# Patient Record
Sex: FEMALE | Race: WHITE | Employment: FULL TIME | URBAN - METROPOLITAN AREA
[De-identification: names, ages, dates, MRNs, and addresses within clinical notes are randomized per-mention and may not be internally consistent; named-entity substitution may affect disease eponyms.]

---

## 2019-04-01 ENCOUNTER — OFFICE VISIT (OUTPATIENT)
Dept: ENDOCRINOLOGY | Age: 44
End: 2019-04-01
Payer: COMMERCIAL

## 2019-04-01 VITALS
WEIGHT: 150.8 LBS | DIASTOLIC BLOOD PRESSURE: 78 MMHG | RESPIRATION RATE: 16 BRPM | SYSTOLIC BLOOD PRESSURE: 116 MMHG | BODY MASS INDEX: 27.75 KG/M2 | HEIGHT: 62 IN | HEART RATE: 86 BPM | OXYGEN SATURATION: 98 %

## 2019-04-01 DIAGNOSIS — E27.40 ADRENAL INSUFFICIENCY (HCC): Primary | ICD-10-CM

## 2019-04-01 DIAGNOSIS — N64.52 BREAST DISCHARGE: ICD-10-CM

## 2019-04-01 PROCEDURE — 99205 OFFICE O/P NEW HI 60 MIN: CPT | Performed by: INTERNAL MEDICINE

## 2019-04-01 RX ORDER — HYDROCHLOROTHIAZIDE 12.5 MG/1
12.5 TABLET ORAL DAILY
COMMUNITY
End: 2019-11-18

## 2019-04-01 RX ORDER — COSYNTROPIN 0.25 MG/ML
0.25 INJECTION, POWDER, FOR SOLUTION INTRAMUSCULAR; INTRAVENOUS ONCE
Qty: 1 EACH | Refills: 0 | Status: SHIPPED | OUTPATIENT
Start: 2019-04-01 | End: 2019-04-01

## 2019-04-01 NOTE — LETTER
[address] [place] [address] Department of Endocrinology Diabetes and Metabolism       Provider: Francisca López MD  [address] [facility], [address] [facility],[address] [address]   Phone: [phone]  Fax: [phone]    Primary Care Physician: Joshua Parsons MD   Referring Provider: Collin Quinn.,*    Patient: Rupert De La Cruz  YOB: 1975  Date of Visit: 4/1/2019      Dear Dr. Joshua Parsons MD   I had the pleasure of seeing your patient Rupert De La Cruz today at endocrine clinic for consultation visit and I enclosed a copy of the office visit completed today. Thank you very much for asking us to participate in the care of this very pleasant patient. Please don't hesitate to call if there are any further questions or concerns. Sincerely   Francisca López MD  Endocrinologist, [facility]   [address] [facility], [address] [place] [address],[address] [address]   Phone: [phone]  Fax: [phone]      ENDOCRINOLOGY CLINIC NOTE   Date of Service: 4/1/2019     Care Team:   Primary Care Physician: Joshua Parsons MD  Referring physician: Collin Quinn.,*  Provider: Francisca López MD     Reason for the visit:   H/o chronic steroid use, ? Secondary adrenal insufficiency     History of Present Illness: The history is provided by the patient. No  was used. Accuracy of the patient data is excellent. Rupert De La Cruz is a very pleasant 37y.o. year-old female with past medical history of asthma seen in endocrine clinic today for evaluation of possible secondary adrenal insufficiency     Over the past 3 years pt received frequent courses of high dose steroid for asthma and joint pain. Last steroid course was 8 months ago.    Shortly after finishing steroid course she start c/o worsening tiredness and fatigue   Pt was seen by her PCP and in 1/2019 she was found to have a random cortisol level of 2.3 (this was done in the afternoon) Repeated AM cortisol level was very good 18,2 as shown below   2019 - random Cortisol 2.3   2019 - AM cortisol was 18.2     Pt denied any hypotension and infact she is on HCTZ 12.5 mg daily   Patient also reported any hard losing Wt below 150Ibs     The patient denied h/o intermittent headache, sweating, or palpitation.  Denied history of diabetes, Wt change, easy bruising, muscle weakness, osteoporosis/fracture, acne, striae, or hyperpigmentation     Thyroid US 2012, I have reviewed images myself  Right lobe measures 4 x 1.2 x 1.5 cm and the left lobe 3.5 x 1.2 x 1.2 cm  Both lobes appear homogenous with no discrete nodules    PAST MEDICAL HISTORY   Past Medical History:   Diagnosis Date    Asthma     Nipple discharge       PAST SURGICAL HISTORY   Past Surgical History:   Procedure Laterality Date    BREAST SURGERY Left      SECTION, LOW TRANSVERSE        SOCIAL HISTORY   Social History     Socioeconomic History    Marital status:      Spouse name: Not on file    Number of children: Not on file    Years of education: Not on file    Highest education level: Not on file   Occupational History    Not on file   Social Needs    Financial resource strain: Not on file    Food insecurity:     Worry: Not on file     Inability: Not on file    Transportation needs:     Medical: Not on file     Non-medical: Not on file   Tobacco Use    Smoking status: Former Smoker     Last attempt to quit: 1996     Years since quittin.4    Smokeless tobacco: Never Used   Substance and Sexual Activity    Alcohol use: No    Drug use: No    Sexual activity: Not on file   Lifestyle    Physical activity:     Days per week: Not on file     Minutes per session: Not on file    Stress: Not on file   Relationships    Social connections:     Talks on phone: Not on file     Gets together: Not on file     Attends Faith service: Not on file Active member of club or organization: Not on file     Attends meetings of clubs or organizations: Not on file     Relationship status: Not on file    Intimate partner violence:     Fear of current or ex partner: Not on file     Emotionally abused: Not on file     Physically abused: Not on file     Forced sexual activity: Not on file   Other Topics Concern    Not on file   Social History Narrative    Not on file      FAMILY HISTORY   Family History   Problem Relation Age of Onset    Heart Disease Mother     Diabetes Mother     Heart Disease Father     Diabetes Father       ALLERGIES AND DRUG REACTIONS   No Known Allergies   CURRENT MEDICATIONS   Current Outpatient Medications   Medication Sig Dispense Refill    hydrochlorothiazide (HYDRODIURIL) 12.5 MG tablet Take 12.5 mg by mouth daily      cosyntropin (CORTROSYN) 0.25 MG injection Infuse 250 mcg intravenously once for 1 dose 1 each 0    montelukast (SINGULAIR) 10 MG tablet Take 10 mg by mouth nightly. No current facility-administered medications for this visit. Review of Systems   Constitutional: No fever, no chills, no diaphoresis, no generalized weakness. HEENT: No blurred vision, No sore throat, no ear pain, no hair loss   Neck: denied any neck swelling, difficulty swallowing,   Cadrdiopulomary: No CP, SOB or palpitation, No orthopnea or PND. No cough or wheezing. GI: No N/V/D, no constipation, No abdominal pain, no melena or hematochezia   : Denied any dysuria, hematuria, flank pain, discharge, or incontinence. Skin: denied any rash, striae ulcer, Hirsute, or hyperpigmentation. MSK: denied any joint deformity, joint pain/swelling, muscle pain, or back pain.    Neuro: no numbess, no tingling, no weakness,     OBJECTIVE   /78 (Site: Left Upper Arm, Position: Sitting, Cuff Size: Medium Adult)   Pulse 86   Resp 16   Ht 5' 2\" (1.575 m)   Wt 150 lb 12.8 oz (68.4 kg)   LMP 03/27/2019   SpO2 98%   BMI 27.58 kg/m² BP Readings from Last 4 Encounters:   04/01/19 116/78      Wt Readings from Last 6 Encounters:   04/01/19 150 lb 12.8 oz (68.4 kg)   11/14/17 151 lb (68.5 kg)        Physical examination:   General: awake alert, oriented x3, no abnormal position or movements. HEENT: normocephalic non traumatic, no exophthalmos   Neck: supple, no LN enlargement, no thyromegaly, no thyroid tenderness, no JVD. Pulm: Clear equal air entry no added sounds, no wheezing or rhonchi   CVS: S1 + S2, no murmur, no heave. Dorsalis pedis pulse palpable   Abd: soft lax, no tenderness, no organomegaly, audible bowel sounds. Skin: warm, no lesions, no rash.  No striae, no bruising, no hirsutism, no tags, no acne   Musculoskeletal: No back tenderness, no kyphosis/scoliosis   Neuro: CN intact, sensation normal, muscle power normal. No tremors   Psych: normal mood, and affect     Review of Laboratory Data:   I personally reviewed the following labs:    1/11/2019  TSH 0.53, Free T4 1.06  1/17/2019  Na 141, K 4.2, Ca 10, Albumin 4.8, Cr 0.8, GFR >60, , , HDL 66.5, , WBC 9.1, Hb 14.1, Plt 288     Lab Results   Component Value Date/Time    WBC 15.3 (H) 11/09/2011 05:30 AM    RBC 2.97 (L) 11/09/2011 05:30 AM    HGB 9.9 (L) 11/09/2011 05:30 AM    HCT 28.2 (L) 11/09/2011 05:30 AM    MCV 94.8 11/09/2011 05:30 AM    MCH 33.3 11/09/2011 05:30 AM    MCHC 35.1 (H) 11/09/2011 05:30 AM    RDW 14.3 11/09/2011 05:30 AM     11/09/2011 05:30 AM    MPV 9.1 11/09/2011 05:30 AM      Lab Results   Component Value Date/Time     11/11/2011 06:25 AM    K 4.0 11/11/2011 06:25 AM    CO2 32 (H) 11/11/2011 06:25 AM    BUN 11 11/11/2011 06:25 AM    CREATININE 0.5 11/11/2011 06:25 AM    CREATININE 0.6 09/18/2011 10:11 AM    CALCIUM 9.0 11/11/2011 06:25 AM    LABGLOM >60 11/11/2011 07:39 AM      Lab Results   Component Value Date/Time    TSH 1.186 09/17/2011 10:05 AM    T4FREE 1.16 09/17/2011 10:05 AM     Lab Results   Component Value Date LABA1C 4.8 09/17/2011    GLUCOSE SEE BELOW 03/30/2012     No results found for: CHOL, TRIG, HDL  No results found for: ALDODIRENCAL   No results found for: ALPRRATIO   No results found for: CHOL, HDL, TRIG   No results found for: PTH   No results found for: VITD25     Thyroid US 12/13/2012, I have reviewed images myself  Right lobe measures 4 x 1.2 x 1.5 cm and the left lobe 3.5 x 1.2 x 1.2 cm  Both lobes appear homogenous with no discrete nodules    Medical Records/Labs/Images review:   I personally reviewed and summarized previous records   All labs and imaging studies were independently reviewed     Robert Gutierrez, a 37 y.o.-old female seen in for the following issues     Low serum cortisol   · Pt at risk for secondary adrenal insufficiency from prolonged steroid use. She was on frequent courses of high steroid for almost 2.5 years for asthma. Last dose was 8 months ago   · Will recheck AM cortisol and also consider ACTH stimulation test if level was low or borderline    · Will follow the results and proceed accordingly     vitD deficiency   · Encourage taking vitD 2000 iu/day  · Check vitD level     Intermittent Left side breast discharge   · Less likely to be hormonally mediated (h/o multiple surgeries on left breast)   · Will check Prolactin level     Return in about 6 months (around 10/1/2019). The above issues were reviewed with the patient who understood and agreed with the plan. Thank you for allowing us to participate in the care of this patient. Please do not hesitate to contact us with any additional questions. Diagnosis Orders   1. Adrenal insufficiency (HCC)  Prolactin    Cortisol Total    cosyntropin (CORTROSYN) 0.25 MG injection    Cortisol Total    Cortisol Total    Cortisol Total    ACTH   2.  Breast discharge  Prolactin     Marisela Ortiz MD   Endocrinologist, [facility])   [address] [facility] [address]   Phone: [phone] Fax: [phone]

## 2019-04-01 NOTE — PROGRESS NOTES
ENDOCRINOLOGY CLINIC NOTE   Date of Service: 2019     Care Team:   Primary Care Physician: Rik Williamson MD  Referring physician: Jordan Mckenna,*  Provider: Justice Muñiz MD     Reason for the visit:   H/o chronic steroid use, ? Secondary adrenal insufficiency     History of Present Illness: The history is provided by the patient. No  was used. Accuracy of the patient data is excellent. Keith Willis is a very pleasant 37y.o. year-old female with past medical history of asthma seen in endocrine clinic today for evaluation of possible secondary adrenal insufficiency     Over the past 3 years pt received frequent courses of high dose steroid for asthma and joint pain. Last steroid course was 8 months ago. Shortly after finishing steroid course she start c/o worsening tiredness and fatigue   Pt was seen by her PCP and in 2019 she was found to have a random cortisol level of 2.3 (this was done in the afternoon)   Repeated AM cortisol level was very good 18,2 as shown below   2019 - random Cortisol 2.3   2019 - AM cortisol was 18.2     Pt denied any hypotension and infact she is on HCTZ 12.5 mg daily   Patient also reported any hard losing Wt below 150Ibs     The patient denied h/o intermittent headache, sweating, or palpitation.  Denied history of diabetes, Wt change, easy bruising, muscle weakness, osteoporosis/fracture, acne, striae, or hyperpigmentation     Thyroid US 2012, I have reviewed images myself  Right lobe measures 4 x 1.2 x 1.5 cm and the left lobe 3.5 x 1.2 x 1.2 cm  Both lobes appear homogenous with no discrete nodules    PAST MEDICAL HISTORY   Past Medical History:   Diagnosis Date    Asthma     Nipple discharge       PAST SURGICAL HISTORY   Past Surgical History:   Procedure Laterality Date    BREAST SURGERY Left      SECTION, LOW TRANSVERSE        SOCIAL HISTORY   Social History     Socioeconomic History    facility-administered medications for this visit. Review of Systems   Constitutional: No fever, no chills, no diaphoresis, no generalized weakness. HEENT: No blurred vision, No sore throat, no ear pain, no hair loss   Neck: denied any neck swelling, difficulty swallowing,   Cadrdiopulomary: No CP, SOB or palpitation, No orthopnea or PND. No cough or wheezing. GI: No N/V/D, no constipation, No abdominal pain, no melena or hematochezia   : Denied any dysuria, hematuria, flank pain, discharge, or incontinence. Skin: denied any rash, striae ulcer, Hirsute, or hyperpigmentation. MSK: denied any joint deformity, joint pain/swelling, muscle pain, or back pain. Neuro: no numbess, no tingling, no weakness,     OBJECTIVE   /78 (Site: Left Upper Arm, Position: Sitting, Cuff Size: Medium Adult)   Pulse 86   Resp 16   Ht 5' 2\" (1.575 m)   Wt 150 lb 12.8 oz (68.4 kg)   LMP 03/27/2019   SpO2 98%   BMI 27.58 kg/m²    BP Readings from Last 4 Encounters:   04/01/19 116/78      Wt Readings from Last 6 Encounters:   04/01/19 150 lb 12.8 oz (68.4 kg)   11/14/17 151 lb (68.5 kg)        Physical examination:   General: awake alert, oriented x3, no abnormal position or movements. HEENT: normocephalic non traumatic, no exophthalmos   Neck: supple, no LN enlargement, no thyromegaly, no thyroid tenderness, no JVD. Pulm: Clear equal air entry no added sounds, no wheezing or rhonchi   CVS: S1 + S2, no murmur, no heave. Dorsalis pedis pulse palpable   Abd: soft lax, no tenderness, no organomegaly, audible bowel sounds. Skin: warm, no lesions, no rash.  No striae, no bruising, no hirsutism, no tags, no acne   Musculoskeletal: No back tenderness, no kyphosis/scoliosis   Neuro: CN intact, sensation normal, muscle power normal. No tremors   Psych: normal mood, and affect     Review of Laboratory Data:   I personally reviewed the following labs:    1/11/2019  TSH 0.53, Free T4 1.06  1/17/2019  Na 141, K 4.2, Ca 10, Albumin 4.8, Cr 0.8, GFR >60, , , HDL 66.5, , WBC 9.1, Hb 14.1, Plt 288     Lab Results   Component Value Date/Time    WBC 15.3 (H) 11/09/2011 05:30 AM    RBC 2.97 (L) 11/09/2011 05:30 AM    HGB 9.9 (L) 11/09/2011 05:30 AM    HCT 28.2 (L) 11/09/2011 05:30 AM    MCV 94.8 11/09/2011 05:30 AM    MCH 33.3 11/09/2011 05:30 AM    MCHC 35.1 (H) 11/09/2011 05:30 AM    RDW 14.3 11/09/2011 05:30 AM     11/09/2011 05:30 AM    MPV 9.1 11/09/2011 05:30 AM      Lab Results   Component Value Date/Time     11/11/2011 06:25 AM    K 4.0 11/11/2011 06:25 AM    CO2 32 (H) 11/11/2011 06:25 AM    BUN 11 11/11/2011 06:25 AM    CREATININE 0.5 11/11/2011 06:25 AM    CREATININE 0.6 09/18/2011 10:11 AM    CALCIUM 9.0 11/11/2011 06:25 AM    LABGLOM >60 11/11/2011 07:39 AM      Lab Results   Component Value Date/Time    TSH 1.186 09/17/2011 10:05 AM    T4FREE 1.16 09/17/2011 10:05 AM     Lab Results   Component Value Date    LABA1C 4.8 09/17/2011    GLUCOSE SEE BELOW 03/30/2012     No results found for: CHOL, TRIG, HDL  No results found for: ALDODIRENCAL   No results found for: ALPRRATIO   No results found for: CHOL, HDL, TRIG   No results found for: PTH   No results found for: VITD25     Thyroid US 12/13/2012, I have reviewed images myself  Right lobe measures 4 x 1.2 x 1.5 cm and the left lobe 3.5 x 1.2 x 1.2 cm  Both lobes appear homogenous with no discrete nodules    Medical Records/Labs/Images review:   I personally reviewed and summarized previous records   All labs and imaging studies were independently reviewed     [address] Berneice Mari, a 37 y.o.-old female seen in for the following issues     Low serum cortisol   · Pt at risk for secondary adrenal insufficiency from prolonged steroid use. She was on frequent courses of high steroid for almost 2.5 years for asthma.  Last dose was 8 months ago   · Will recheck AM cortisol and also consider ACTH stimulation test if level

## 2019-04-01 NOTE — PATIENT INSTRUCTIONS
ACTH Stimulation test instructions:   1. Please measure cortisol and ACTH levels and time 0  2.  Immediately after, give 250 mcg coysntropin via injection  3. 30 and 60 minutes after cosyntropin given, measure cortisol levels    Timing of the above is critical to ensure appropriate interpretation of this test.

## 2019-04-04 DIAGNOSIS — E27.40 HYPOADRENALISM (HCC): ICD-10-CM

## 2019-04-04 RX ORDER — COSYNTROPIN 0.25 MG/ML
0.25 INJECTION, POWDER, FOR SOLUTION INTRAMUSCULAR; INTRAVENOUS ONCE
Status: CANCELLED
Start: 2019-04-08

## 2019-04-08 ENCOUNTER — HOSPITAL ENCOUNTER (OUTPATIENT)
Dept: INFUSION THERAPY | Age: 44
Setting detail: INFUSION SERIES
Discharge: HOME | End: 2019-04-08
Payer: COMMERCIAL

## 2019-04-08 VITALS
TEMPERATURE: 98.7 F | HEART RATE: 76 BPM | BODY MASS INDEX: 28.71 KG/M2 | DIASTOLIC BLOOD PRESSURE: 84 MMHG | SYSTOLIC BLOOD PRESSURE: 120 MMHG | RESPIRATION RATE: 16 BRPM | HEIGHT: 62 IN | WEIGHT: 156 LBS | OXYGEN SATURATION: 98 %

## 2019-04-08 DIAGNOSIS — E27.40 ADRENAL INSUFFICIENCY (HCC): Primary | ICD-10-CM

## 2019-04-08 DIAGNOSIS — E27.40 HYPOADRENALISM (HCC): ICD-10-CM

## 2019-04-08 DIAGNOSIS — N64.52 BREAST DISCHARGE: ICD-10-CM

## 2019-04-08 LAB
CORTISOL TOTAL: 20.93 MCG/DL (ref 2.68–18.4)
CORTISOL TOTAL: 25.01 MCG/DL (ref 2.68–18.4)
CORTISOL TOTAL: 5.29 MCG/DL (ref 2.68–18.4)
PROLACTIN: 13.94 NG/ML

## 2019-04-08 PROCEDURE — 82533 TOTAL CORTISOL: CPT

## 2019-04-08 PROCEDURE — 84146 ASSAY OF PROLACTIN: CPT

## 2019-04-08 PROCEDURE — 6360000002 HC RX W HCPCS: Performed by: INTERNAL MEDICINE

## 2019-04-08 PROCEDURE — 2580000003 HC RX 258: Performed by: INTERNAL MEDICINE

## 2019-04-08 PROCEDURE — 96374 THER/PROPH/DIAG INJ IV PUSH: CPT

## 2019-04-08 PROCEDURE — 36415 COLL VENOUS BLD VENIPUNCTURE: CPT

## 2019-04-08 PROCEDURE — 82024 ASSAY OF ACTH: CPT

## 2019-04-08 RX ORDER — COSYNTROPIN 0.25 MG/ML
0.25 INJECTION, POWDER, FOR SOLUTION INTRAMUSCULAR; INTRAVENOUS ONCE
Status: COMPLETED | OUTPATIENT
Start: 2019-04-08 | End: 2019-04-08

## 2019-04-08 RX ORDER — COSYNTROPIN 0.25 MG/ML
0.25 INJECTION, POWDER, FOR SOLUTION INTRAMUSCULAR; INTRAVENOUS ONCE
Status: CANCELLED
Start: 2019-04-08

## 2019-04-08 RX ORDER — SODIUM CHLORIDE 0.9 % (FLUSH) 0.9 %
10 SYRINGE (ML) INJECTION PRN
Status: DISCONTINUED | OUTPATIENT
Start: 2019-04-08 | End: 2019-04-09 | Stop reason: HOSPADM

## 2019-04-08 RX ADMIN — COSYNTROPIN 0.25 MG: 0.25 INJECTION, POWDER, LYOPHILIZED, FOR SOLUTION INTRAMUSCULAR; INTRAVENOUS at 09:56

## 2019-04-08 RX ADMIN — Medication 10 ML: at 09:54

## 2019-04-08 RX ADMIN — Medication 10 ML: at 09:58

## 2019-04-11 LAB — ADRENOCORTICOTROPIC HORMONE: 9 PG/ML (ref 6–58)

## 2019-04-12 ENCOUNTER — TELEPHONE (OUTPATIENT)
Dept: ENDOCRINOLOGY | Age: 44
End: 2019-04-12

## 2019-04-12 NOTE — TELEPHONE ENCOUNTER
I called pt and discussed her recent lab results     She is concerning about Wt gain and I explained to her that cortisol is not the reason for Wt gain

## 2019-04-12 NOTE — TELEPHONE ENCOUNTER
Notify pt,  I have reviewed your recent lab results    Excellent!  You responded very well to ACTH stimulation test and cortisol level increased form 5.29 to 25.01     This result essentially r/o adrenal insufficiency

## 2019-11-18 ENCOUNTER — OFFICE VISIT (OUTPATIENT)
Dept: ENDOCRINOLOGY | Age: 44
End: 2019-11-18
Payer: COMMERCIAL

## 2019-11-18 VITALS
HEART RATE: 79 BPM | WEIGHT: 142 LBS | OXYGEN SATURATION: 99 % | HEIGHT: 62 IN | RESPIRATION RATE: 16 BRPM | BODY MASS INDEX: 26.13 KG/M2

## 2019-11-18 DIAGNOSIS — E55.9 VITAMIN D DEFICIENCY: Primary | ICD-10-CM

## 2019-11-18 DIAGNOSIS — Z86.39 H/O ADRENAL INSUFFICIENCY: ICD-10-CM

## 2019-11-18 PROCEDURE — 99214 OFFICE O/P EST MOD 30 MIN: CPT | Performed by: INTERNAL MEDICINE

## 2020-03-20 ENCOUNTER — HOSPITAL ENCOUNTER (OUTPATIENT)
Dept: GENERAL RADIOLOGY | Age: 45
Discharge: HOME | End: 2020-03-22
Payer: COMMERCIAL

## 2020-03-20 ENCOUNTER — HOSPITAL ENCOUNTER (OUTPATIENT)
Age: 45
Discharge: HOME | End: 2020-03-22
Payer: COMMERCIAL

## 2020-03-20 PROCEDURE — 71046 X-RAY EXAM CHEST 2 VIEWS: CPT

## 2020-11-19 ENCOUNTER — OFFICE VISIT (OUTPATIENT)
Dept: ENDOCRINOLOGY | Age: 45
End: 2020-11-19
Payer: COMMERCIAL

## 2020-11-19 VITALS
DIASTOLIC BLOOD PRESSURE: 70 MMHG | TEMPERATURE: 97.2 F | HEART RATE: 88 BPM | SYSTOLIC BLOOD PRESSURE: 116 MMHG | BODY MASS INDEX: 25.46 KG/M2 | WEIGHT: 139.2 LBS

## 2020-11-19 PROCEDURE — 99214 OFFICE O/P EST MOD 30 MIN: CPT | Performed by: INTERNAL MEDICINE

## 2020-11-19 PROCEDURE — G8427 DOCREV CUR MEDS BY ELIG CLIN: HCPCS | Performed by: INTERNAL MEDICINE

## 2020-11-19 PROCEDURE — 1036F TOBACCO NON-USER: CPT | Performed by: INTERNAL MEDICINE

## 2020-11-19 PROCEDURE — G8484 FLU IMMUNIZE NO ADMIN: HCPCS | Performed by: INTERNAL MEDICINE

## 2020-11-19 PROCEDURE — G8419 CALC BMI OUT NRM PARAM NOF/U: HCPCS | Performed by: INTERNAL MEDICINE

## 2020-11-19 NOTE — PROGRESS NOTES
easy bruising, muscle weakness, osteoporosis/fracture, acne, striae, or hyperpigmentation     Thyroid US 2012, I have reviewed images myself  Right lobe measures 4 x 1.2 x 1.5 cm and the left lobe 3.5 x 1.2 x 1.2 cm  Both lobes appear homogenous with no discrete nodules    PAST MEDICAL HISTORY   Past Medical History:   Diagnosis Date    Asthma     Hypertension     Nipple discharge       PAST SURGICAL HISTORY   Past Surgical History:   Procedure Laterality Date    BREAST SURGERY Left      SECTION, LOW TRANSVERSE  2010      SOCIAL HISTORY   Tobacco:   reports that she quit smoking about 24 years ago. She has never used smokeless tobacco.  Alcol:   reports current alcohol use. Illicit Drugs:   reports no history of drug use. FAMILY HISTORY   Family History   Problem Relation Age of Onset    Heart Disease Mother     Diabetes Mother     Heart Disease Father     Diabetes Father       ALLERGIES AND DRUG REACTIONS   Allergies   Allergen Reactions    Serum-Derived Bovine Immunoglobulin [Bovine Complex]      ANIMAL BASED SERUM      CURRENT MEDICATIONS   Current Outpatient Medications   Medication Sig Dispense Refill    Cholecalciferol (VITAMIN D3) 125 MCG (5000 UT) TABS Take by mouth 3-4 tablets at night      montelukast (SINGULAIR) 10 MG tablet Take 10 mg by mouth nightly. No current facility-administered medications for this visit. Review of Systems   Constitutional: No fever, no chills, no diaphoresis, no generalized weakness. HEENT: No blurred vision, No sore throat, no ear pain, no hair loss   Neck: denied any neck swelling, difficulty swallowing,   Cadrdiopulomary: No CP, SOB or palpitation, No orthopnea or PND. No cough or wheezing. GI: No N/V/D, no constipation, No abdominal pain, no melena or hematochezia   : Denied any dysuria, hematuria, flank pain, discharge, or incontinence. Skin: denied any rash, striae ulcer, Hirsute, or hyperpigmentation.    MSK: denied any joint deformity, joint pain/swelling, muscle pain, or back pain. Neuro: no numbess, no tingling, no weakness,     OBJECTIVE   /70 (Site: Right Upper Arm, Position: Sitting, Cuff Size: Medium Adult)   Pulse 88   Temp 97.2 °F (36.2 °C) (Temporal)   Wt 139 lb 3.2 oz (63.1 kg)   BMI 25.46 kg/m²    BP Readings from Last 4 Encounters:   11/19/20 116/70   04/08/19 120/84   04/01/19 116/78      Wt Readings from Last 6 Encounters:   11/19/20 139 lb 3.2 oz (63.1 kg)   11/18/19 142 lb (64.4 kg)   04/08/19 156 lb (70.8 kg)   04/01/19 150 lb 12.8 oz (68.4 kg)   11/14/17 151 lb (68.5 kg)        Physical examination:   General: awake alert, oriented x3, no abnormal position or movements. HEENT: normocephalic non traumatic, no exophthalmos   Neck: supple, no LN enlargement, no thyromegaly, no thyroid tenderness, no JVD. Pulm: Clear equal air entry no added sounds, no wheezing or rhonchi   CVS: S1 + S2, no murmur, no heave. Dorsalis pedis pulse palpable   Abd: soft lax, no tenderness, no organomegaly, audible bowel sounds. Skin: warm, no lesions, no rash.  No striae, no bruising, no hirsutism, no tags, no acne   Musculoskeletal: No back tenderness, no kyphosis/scoliosis   Neuro: CN intact, sensation normal, muscle power normal. No tremors   Psych: normal mood, and affect     Review of Laboratory Data:   I personally reviewed the following labs:    1/11/2019  TSH 0.53, Free T4 1.06  1/17/2019  Na 141, K 4.2, Ca 10, Albumin 4.8, Cr 0.8, GFR >60, , , HDL 66.5, , WBC 9.1, Hb 14.1, Plt 288     Lab Results   Component Value Date/Time    WBC 15.3 (H) 11/09/2011 05:30 AM    RBC 2.97 (L) 11/09/2011 05:30 AM    HGB 9.9 (L) 11/09/2011 05:30 AM    HCT 28.2 (L) 11/09/2011 05:30 AM    MCV 94.8 11/09/2011 05:30 AM    MCH 33.3 11/09/2011 05:30 AM    MCHC 35.1 (H) 11/09/2011 05:30 AM    RDW 14.3 11/09/2011 05:30 AM     11/09/2011 05:30 AM    MPV 9.1 11/09/2011 05:30 AM      Lab Results   Component Value Date/Time     11/11/2011 06:25 AM    K 4.0 11/11/2011 06:25 AM    CO2 32 (H) 11/11/2011 06:25 AM    BUN 11 11/11/2011 06:25 AM    CREATININE 0.5 11/11/2011 06:25 AM    CREATININE 0.6 09/18/2011 10:11 AM    CALCIUM 9.0 11/11/2011 06:25 AM    LABGLOM >60 11/11/2011 07:39 AM      Lab Results   Component Value Date/Time    TSH 1.186 09/17/2011 10:05 AM    T4FREE 1.16 09/17/2011 10:05 AM     Lab Results   Component Value Date    LABA1C 4.8 09/17/2011    GLUCOSE SEE BELOW 03/30/2012     No results found for: CHOL, TRIG, HDL  No results found for: ALDODIRENCAL   No results found for: ALPRRATIO   No results found for: CHOL, HDL, TRIG   No results found for: PTH   No results found for: VITD25     Thyroid US 12/13/2012, I have reviewed images myself  Right lobe measures 4 x 1.2 x 1.5 cm and the left lobe 3.5 x 1.2 x 1.2 cm  Both lobes appear homogenous with no discrete nodules    Medical Records/Labs/Images review:   I personally reviewed and summarized previous records   All labs and imaging studies were independently reviewed     [address] Hmuberto Gonsalez, a 50 y.o.-old female seen in for the following issues     Low serum cortisol   · Last steroids use was > year ago   · Previous work up essentially r/o adrenal insufficiency ACTH stim test 4/2019 --> normal response      Wt gain   · Will refer to our Wt loss clinic   · Check TFT     vitD deficiency   · Continue vitD supplement     H/o intermittent Left side breast discharge   · Less likely to be hormonally mediated (h/o multiple surgeries on left breast)   · 4/2019 Prolactin normal (13.9)     Return if symptoms worsen or fail to improve, for Weighrt gain . The above issues were reviewed with the patient who understood and agreed with the plan  Thank you for allowing us to participate in the care of this patient. Please do not hesitate to contact us with any additional questions. Diagnosis Orders   1. Vitamin D deficiency     2.  H/O adrenal insufficiency     3. Weight gain  TSH without Reflex    T4, Free    [address] - Eugene Gonzalez MD, Gibran Powell, Surgical Weight Loss [address] [facility] MD   Endocrinologist, [place] [address]   Phone: [phone]   Fax: [phone]   ----------------------------  An electronic signature was used to authenticate this note.  Dav Bauman MD on 11/19/2020 at 8:16 AM

## 2020-12-31 ENCOUNTER — TELEPHONE (OUTPATIENT)
Dept: BARIATRICS/WEIGHT MGMT | Age: 45
End: 2020-12-31

## 2020-12-31 NOTE — TELEPHONE ENCOUNTER
I called pt lm on vm that there is no billable dx for pt to be seen.  Office number left if anything changes

## 2022-05-02 ENCOUNTER — HOSPITAL ENCOUNTER (OUTPATIENT)
Age: 47
Discharge: HOME | End: 2022-05-04
Payer: COMMERCIAL

## 2022-05-02 ENCOUNTER — HOSPITAL ENCOUNTER (OUTPATIENT)
Dept: GENERAL RADIOLOGY | Age: 47
Discharge: HOME | End: 2022-05-04
Payer: COMMERCIAL

## 2022-05-02 DIAGNOSIS — R07.9 CHEST PAIN, UNSPECIFIED TYPE: ICD-10-CM

## 2022-05-02 PROCEDURE — 71046 X-RAY EXAM CHEST 2 VIEWS: CPT

## 2022-05-19 ENCOUNTER — HOSPITAL ENCOUNTER (OUTPATIENT)
Dept: GENERAL RADIOLOGY | Age: 47
Discharge: HOME | End: 2022-05-21
Payer: COMMERCIAL

## 2022-05-19 ENCOUNTER — HOSPITAL ENCOUNTER (OUTPATIENT)
Age: 47
Discharge: HOME | End: 2022-05-21
Payer: COMMERCIAL

## 2022-05-19 DIAGNOSIS — M54.9 DORSALGIA: ICD-10-CM

## 2022-05-19 PROCEDURE — 72072 X-RAY EXAM THORAC SPINE 3VWS: CPT

## 2023-07-10 ENCOUNTER — APPOINTMENT (OUTPATIENT)
Dept: CT IMAGING | Age: 48
End: 2023-07-10
Payer: COMMERCIAL

## 2023-07-10 ENCOUNTER — HOSPITAL ENCOUNTER (EMERGENCY)
Age: 48
Discharge: HOME | End: 2023-07-10
Payer: COMMERCIAL

## 2023-07-10 VITALS
OXYGEN SATURATION: 100 % | BODY MASS INDEX: 26.68 KG/M2 | SYSTOLIC BLOOD PRESSURE: 130 MMHG | WEIGHT: 145 LBS | HEIGHT: 62 IN | RESPIRATION RATE: 16 BRPM | DIASTOLIC BLOOD PRESSURE: 90 MMHG | HEART RATE: 80 BPM | TEMPERATURE: 98.2 F

## 2023-07-10 DIAGNOSIS — S01.81XA FACIAL LACERATION, INITIAL ENCOUNTER: Primary | ICD-10-CM

## 2023-07-10 DIAGNOSIS — S09.90XA INJURY OF HEAD, INITIAL ENCOUNTER: ICD-10-CM

## 2023-07-10 PROCEDURE — 99284 EMERGENCY DEPT VISIT MOD MDM: CPT

## 2023-07-10 PROCEDURE — 90714 TD VACC NO PRESV 7 YRS+ IM: CPT | Performed by: PHYSICIAN ASSISTANT

## 2023-07-10 PROCEDURE — 6360000002 HC RX W HCPCS: Performed by: PHYSICIAN ASSISTANT

## 2023-07-10 PROCEDURE — 12011 RPR F/E/E/N/L/M 2.5 CM/<: CPT

## 2023-07-10 PROCEDURE — 6370000000 HC RX 637 (ALT 250 FOR IP): Performed by: PHYSICIAN ASSISTANT

## 2023-07-10 PROCEDURE — 2500000003 HC RX 250 WO HCPCS: Performed by: PHYSICIAN ASSISTANT

## 2023-07-10 PROCEDURE — 90471 IMMUNIZATION ADMIN: CPT | Performed by: PHYSICIAN ASSISTANT

## 2023-07-10 PROCEDURE — 70486 CT MAXILLOFACIAL W/O DYE: CPT

## 2023-07-10 PROCEDURE — 70450 CT HEAD/BRAIN W/O DYE: CPT

## 2023-07-10 RX ORDER — ACETAMINOPHEN 500 MG
1000 TABLET ORAL ONCE
Status: COMPLETED | OUTPATIENT
Start: 2023-07-10 | End: 2023-07-10

## 2023-07-10 RX ORDER — TETANUS AND DIPHTHERIA TOXOIDS ADSORBED 2; 2 [LF]/.5ML; [LF]/.5ML
0.5 INJECTION INTRAMUSCULAR ONCE
Status: COMPLETED | OUTPATIENT
Start: 2023-07-10 | End: 2023-07-10

## 2023-07-10 RX ORDER — LIDOCAINE HYDROCHLORIDE 10 MG/ML
5 INJECTION, SOLUTION EPIDURAL; INFILTRATION; INTRACAUDAL; PERINEURAL ONCE
Status: COMPLETED | OUTPATIENT
Start: 2023-07-10 | End: 2023-07-10

## 2023-07-10 RX ORDER — ACETAMINOPHEN 500 MG
1000 TABLET ORAL EVERY 8 HOURS PRN
Qty: 30 TABLET | Refills: 0 | Status: SHIPPED | OUTPATIENT
Start: 2023-07-10 | End: 2023-07-15

## 2023-07-10 RX ORDER — BACITRACIN ZINC 500 [USP'U]/G
OINTMENT TOPICAL ONCE
Status: COMPLETED | OUTPATIENT
Start: 2023-07-10 | End: 2023-07-10

## 2023-07-10 RX ADMIN — TETANUS AND DIPHTHERIA TOXOIDS ADSORBED 0.5 ML: 2; 2 INJECTION INTRAMUSCULAR at 17:03

## 2023-07-10 RX ADMIN — BACITRACIN ZINC: 500 OINTMENT TOPICAL at 17:47

## 2023-07-10 RX ADMIN — ACETAMINOPHEN 1000 MG: 500 TABLET ORAL at 17:03

## 2023-07-10 RX ADMIN — LIDOCAINE HYDROCHLORIDE 5 ML: 10 INJECTION, SOLUTION EPIDURAL; INFILTRATION; INTRACAUDAL; PERINEURAL at 17:47

## 2023-07-10 ASSESSMENT — PAIN - FUNCTIONAL ASSESSMENT: PAIN_FUNCTIONAL_ASSESSMENT: 0-10

## 2023-07-10 ASSESSMENT — LIFESTYLE VARIABLES
HOW MANY STANDARD DRINKS CONTAINING ALCOHOL DO YOU HAVE ON A TYPICAL DAY: 1 OR 2
HOW OFTEN DO YOU HAVE A DRINK CONTAINING ALCOHOL: MONTHLY OR LESS

## 2023-07-10 ASSESSMENT — PAIN DESCRIPTION - DESCRIPTORS: DESCRIPTORS: ACHING

## 2023-07-10 ASSESSMENT — PAIN DESCRIPTION - LOCATION
LOCATION: HAND
LOCATION: HEAD

## 2023-07-10 ASSESSMENT — PAIN SCALES - GENERAL
PAINLEVEL_OUTOF10: 9
PAINLEVEL_OUTOF10: 8

## 2023-07-10 ASSESSMENT — PAIN DESCRIPTION - ORIENTATION: ORIENTATION: LEFT

## 2023-07-10 NOTE — ED PROVIDER NOTES
Independent [facility] Visit. [facility]  Department of Emergency Medicine   ED  Encounter Note  Admit Date/RoomTime: 7/10/2023  4:34 PM  ED Room: /37    NAME: Meggan Larson  : 1975  MRN: [id number]     Chief Complaint:  Head Injury (Baseball to left forehead, no loc, no thinners )    History of Present Illness         Meggan Larson is a 50 y.o. old female who presents to the emergency department by private vehicle, for head injury which occured 30 minute(s) prior to arrival. The complaint is due to being struck in the left eyebrow by a baseball thrown by her son during practice. Loss of consciousness did not occur. The injury has been associated with headache and denies any confusion, fever, nasal congestion, abdominal pain, neck pain, back pain, chest pain, palpitations, vertigo, dizziness, diplopia, loss of vision, slurred speech, focal weakness, focal sensory loss, clumsiness, nausea, vomiting, incontinence, or seizure activity. Previous head injury: no.  Since onset the symptoms have been moderate in degree. Her pain is aggraveated by palpation and relieved by nothing. She takes no blood thinning agents. The patients tetanus status is unknown. ROS   Pertinent positives and negatives are stated within HPI, all other systems reviewed and are negative. Past Medical History:  has a past medical history of Asthma, Hypertension, and Nipple discharge. Surgical History:  has a past surgical history that includes  section, low transverse (2010) and Breast surgery (Left). Social History:  reports that she quit smoking about 26 years ago. She has never used smokeless tobacco. She reports current alcohol use. She reports that she does not use drugs. Family History: family history includes Diabetes in her father and mother; Heart Disease in her father and mother.      Allergies: Serum-derived bovine immunoglobulin [bovine complex]    Physical Exam DC instructions

## 2024-04-22 ENCOUNTER — CLINICAL DOCUMENTATION (OUTPATIENT)
Dept: GENERAL RADIOLOGY | Age: 49
End: 2024-04-22

## 2024-05-08 ENCOUNTER — HOSPITAL ENCOUNTER (OUTPATIENT)
Dept: GENERAL RADIOLOGY | Age: 49
Discharge: HOME | End: 2024-05-10
Payer: COMMERCIAL

## 2024-05-08 VITALS — BODY MASS INDEX: 28.52 KG/M2 | HEIGHT: 62 IN | WEIGHT: 155 LBS

## 2024-05-08 DIAGNOSIS — N63.32 UNSPECIFIED LUMP IN AXILLARY TAIL OF THE LEFT BREAST: ICD-10-CM

## 2024-05-08 DIAGNOSIS — N64.4 MASTODYNIA: ICD-10-CM

## 2024-05-08 PROCEDURE — G0279 TOMOSYNTHESIS, MAMMO: HCPCS
